# Patient Record
Sex: MALE | Race: WHITE | NOT HISPANIC OR LATINO | Employment: FULL TIME | URBAN - METROPOLITAN AREA
[De-identification: names, ages, dates, MRNs, and addresses within clinical notes are randomized per-mention and may not be internally consistent; named-entity substitution may affect disease eponyms.]

---

## 2018-09-18 NOTE — EDM.PDOC
ED HPI GENERAL MEDICAL PROBLEM





- General


Chief Complaint: Lower Extremity Injury/Pain


Stated Complaint: [facility]


Time Seen by Provider: 09/18/18 23:32


Source of Information: Reports: Patient


History Limitations: Reports: No Limitations





- History of Present Illness


INITIAL COMMENTS - FREE TEXT/NARRATIVE: 





The patient presents by [facility] for bleeding from his right lower 

leg.  The patient he helped a friend move over a week ago.  He fell and had an 

abrasion to the right leg.  Today he was at work at [employer] and the scab broke 

loose in the right leg and it started bleeding.  He has lymphedema in both 

legs.  He does have what appears to be a venous stasus ulcer or an abrasion 

that was not healing.


Onset: Sudden


Duration: Minutes:


Location: Reports: Lower Extremity, Right (lower leg)


Improves with: Reports: None


Worsens with: Reports: None


Associated Symptoms: Reports: No Other Symptoms





- Related Data


 Allergies











Allergy/AdvReac Type Severity Reaction Status Date / Time


 


No Known Allergies Allergy   Verified 09/18/18 23:28











Home Meds: 


 Home Meds





Cephalexin [Keflex] 500 mg PO QID #40 capsule 09/18/18 [Rx]











Past Medical History


Endocrine/Metabolic History: Reports: Other (See Below)


Other Endocrine/Metabolic History: Lymphadema





- Past Surgical History


HEENT Surgical History: Reports: Tonsillectomy





Social & Family History





- Tobacco Use


Smoking Status *Q: Never Smoker





- Recreational Drug Use


Recreational Drug Use: No





Review of Systems





- Review of Systems


Review Of Systems: See Below


Constitutional: Reports: No Symptoms


Eyes: Reports: No Symptoms


Ears: Reports: No Symptoms


Nose: Reports: No Symptoms


Mouth/Throat: Reports: No Symptoms


Respiratory: Reports: No Symptoms


Cardiovascular: Reports: No Symptoms


GI/Abdominal: Reports: No Symptoms


Genitourinary: Reports: No Symptoms


Musculoskeletal: Reports: Other (Bleeding from the right lower leg)





ED EXAM, GENERAL





- Physical Exam


Exam: See Below


Exam Limited By: No Limitations


General Appearance: Alert, No Apparent Distress


Ears: Normal External Exam


Nose: Normal Inspection


Head: Atraumatic, Normocephalic


Neck: Normal Inspection


Respiratory/Chest: No Respiratory Distress


Extremities: Other (Venous stasis ulcer or a nonhealed ulcer to the right lower 

leg with no active bleeding at this time but there is erythema around the wound.

)





Course





- Vital Signs


Last Recorded V/S: 





 Last Vital Signs











Temp  98.4 F   09/18/18 23:28


 


Pulse  97   09/18/18 23:28


 


Resp  20   09/18/18 23:28


 


BP  144/80 H  09/18/18 23:28


 


Pulse Ox  95   09/18/18 23:28














- Re-Assessments/Exams


Free Text/Narrative Re-Assessment/Exam: 





09/18/18 23:54


I will have my nurse clean and dress the wound.  There is erythema and this 

more then likely is infected.  I will need to get him on some keflex and have 

him follow up with one of our providers.





Departure





- Departure


Time of Disposition: 23:55


Disposition: Home, Self-Care 01


Condition: Good


Clinical Impression: 


 Cellulitis of right leg





Ulcer of right leg


Qualifiers:


 Non-pressure ulcer stage: unspecified non-pressure ulcer stage Qualified Code(s

): L97.919 - Non-pressure chronic ulcer of unspecified part of right lower leg 

with unspecified severity








- Discharge Information


*PRESCRIPTION DRUG MONITORING PROGRAM REVIEWED*: No


*COPY OF PRESCRIPTION DRUG MONITORING REPORT IN PATIENT BOONE: No


Prescriptions: 


Cephalexin [Keflex] 500 mg PO QID #40 capsule


Referrals: 


Kaya Link PA-C [Physician Assistant] - 1 Week


Additional Instructions: 


Take the keflex 4 times per day for 10 days.  Wash your leg in warm soapy water 

2 times per day and apply antibiotic ointment after.  Please return if you are 

back.

## 2019-12-02 NOTE — EDM.PDOC
ED HPI GENERAL MEDICAL PROBLEM





- General


Chief Complaint: Lower Extremity Injury/Pain


Stated Complaint: RT LEG IS SWOLLEN LEAKING FLUID SENT FROM [facility]


Time Seen by Provider: 12/02/19 17:06


Source of Information: Reports: Patient


History Limitations: Reports: No Limitations





- History of Present Illness


INITIAL COMMENTS - FREE TEXT/NARRATIVE: 





The patient was sent from the walk in clinic.  He has a history of lymphedema 

and his right leg started swelling more on Thanksgiving.  He then developed 

some redness and skin break down.  He has no fever or chills or other symptoms.

  This has been worse then this and he is hoping to try some oral antibiotics 

first.


Onset: Gradual


Duration: Day(s):


Location: Reports: Lower Extremity, Right (lower leg)


Improves with: Reports: None


Worsens with: Reports: None


Associated Symptoms: Reports: No Other Symptoms


  ** Right Lower Leg


Pain Score (Numeric/FACES): 6





- Related Data


 Allergies











Allergy/AdvReac Type Severity Reaction Status Date / Time


 


No Known Allergies Allergy   Verified 12/02/19 17:08











Home Meds: 


 Home Meds





Cephalexin [Keflex] 500 mg PO QID #40 capsule 09/18/18 [Rx]


Sulfamethoxazole/Trimethoprim [Bactrim Ds Tablet] 2 each PO BID #40 tablet 12/02 /19 [Rx]











Past Medical History


Endocrine/Metabolic History: Reports: Other (See Below)


Other Endocrine/Metabolic History: Lymphadema





- Past Surgical History


HEENT Surgical History: Reports: Tonsillectomy





Social & Family History





- Tobacco Use


Smoking Status *Q: Never Smoker





- Caffeine Use


Caffeine Use: Reports: Coffee





Review of Systems





- Review of Systems


Review Of Systems: See Below


Constitutional: Reports: No Symptoms


Eyes: Reports: No Symptoms


Ears: Reports: No Symptoms


Nose: Reports: No Symptoms


Mouth/Throat: Reports: No Symptoms


Respiratory: Reports: No Symptoms


Cardiovascular: Reports: No Symptoms


GI/Abdominal: Reports: No Symptoms


Genitourinary: Reports: No Symptoms


Musculoskeletal: Reports: Other (right leg swelling and erythema)





ED EXAM, GENERAL





- Physical Exam


Exam: See Below


Exam Limited By: No Limitations


General Appearance: Alert, No Apparent Distress


Ears: Normal External Exam


Nose: Normal Inspection


Head: Atraumatic, Normocephalic


Neck: Normal Inspection


Respiratory/Chest: No Respiratory Distress, Lungs Clear, Normal Breath Sounds


Cardiovascular: Regular Rate, Rhythm, No Edema, No Murmur


GI/Abdominal: Soft, Non-Tender, No Organomegaly, No Mass


Back Exam: Normal Inspection


Extremities: Other (Moderate to sever edema to the right lower leg with 

erythema and some skin break down and drainage.)





Course





- Vital Signs


Last Recorded V/S: 





 Last Vital Signs











Temp  97.5 F   12/02/19 17:05


 


Pulse  83   12/02/19 17:05


 


Resp  16   12/02/19 17:05


 


BP  150/89 H  12/02/19 17:05


 


Pulse Ox  99   12/02/19 17:05














- Re-Assessments/Exams


Free Text/Narrative Re-Assessment/Exam: 





12/02/19 17:23


He would like to try this as an outpatient with oral antibiotics.  I will get 

him on some bactrim.





Departure





- Departure


Time of Disposition: 17:30


Disposition: Home, Self-Care 01


Condition: Good


Clinical Impression: 


 Cellulitis of right leg








- Discharge Information


*PRESCRIPTION DRUG MONITORING PROGRAM REVIEWED*: No


*COPY OF PRESCRIPTION DRUG MONITORING REPORT IN PATIENT BOONE: No


Prescriptions: 


Sulfamethoxazole/Trimethoprim [Bactrim Ds Tablet] 2 each PO BID #40 tablet


Referrals: 


PCP,Not In Area [Primary Care Provider] - 


Additional Instructions: 


Clean your leg with warm soapy water 2 times per day and apply antibiotic 

ointment after.  Take the bactrim DS 2 pills 2 times per day for 10 days.  Put 

warm compresses on your leg 2 to 3 times per day and please return if you are 

worse.

## 2020-03-28 NOTE — EDM.PDOC
ED HPI GENERAL MEDICAL PROBLEM





- General


Chief Complaint: Skin Complaint


Stated Complaint: SKIN COMPLAINT/R LEG


Time Seen by Provider: 03/28/20 15:42


Source of Information: Reports: Patient


History Limitations: Reports: No Limitations





- History of Present Illness


INITIAL COMMENTS - FREE TEXT/NARRATIVE: 





Patient is a 39-year-old male who presents with complaints of redness or warmth

, and erythema to his right lower extremity.  Patient has a diagnosis of 

lymphedema for which he has wraps applied weekly with physical therapy.  He 

states yesterday he noticed some redness and warmth around the scab to the 

medial aspect of his right knee.  He has had occurrences such as this in the 

past and been treated with oral antibiotics for cellulitis in the infection 

clears up well.  Has not had a fever, chills, nausea, vomiting, or diarrhea.  

He sees physical therapy on Monday for wound care.


  ** Right Knee


Pain Score (Numeric/FACES): 3





- Related Data


 Allergies











Allergy/AdvReac Type Severity Reaction Status Date / Time


 


No Known Allergies Allergy   Verified 12/02/19 17:08











Home Meds: 


 Home Meds





Empagliflozin [Jardiance] 25 mg PO DAILY 03/28/20 [History]


Furosemide 20 mg PO DAILY 03/28/20 [History]


Sulfamethoxazole/Trimethoprim [Bactrim Ds Tablet] 2 each PO BID 10 Days #40 

tablet 03/28/20 [Rx]











Past Medical History


Endocrine/Metabolic History: Reports: Other (See Below)


Other Endocrine/Metabolic History: Lymphadema





- Past Surgical History


HEENT Surgical History: Reports: Tonsillectomy





Social & Family History





- Tobacco Use


Smoking Status *Q: Never Smoker





- Caffeine Use


Caffeine Use: Reports: Tea





- Recreational Drug Use


Recreational Drug Use: No





ED ROS GENERAL





- Review of Systems


Review Of Systems: Comprehensive ROS is negative, except as noted in HPI.





ED EXAM, SKIN/RASH


Exam: See Below


Exam Limited By: No Limitations


General Appearance: Alert, WD/WN, No Apparent Distress


Respiratory/Chest: No Respiratory Distress, Lungs Clear, Normal Breath Sounds, 

No Accessory Muscle Use, Chest Non-Tender


Cardiovascular: Normal Peripheral Pulses, Regular Rate, Rhythm, No Edema, No 

Gallop, No JVD, No Murmur, No Rub


Extremities: Other (Lymphedema to bilateral lower extremities.  Right more 

edematous than the left.  Small scab with mild redness, warmth, and erythema 

surrounding to the medial aspect of the right knee.  )





Course





- Vital Signs


Last Recorded V/S: 





 Last Vital Signs











Temp  97.2 F   03/28/20 14:56


 


Pulse  69   03/28/20 14:56


 


Resp  16   03/28/20 14:56


 


BP  135/72   03/28/20 14:56


 


Pulse Ox  96   03/28/20 14:56














- Re-Assessments/Exams


Free Text/Narrative Re-Assessment/Exam: 





03/28/20 16:04


Wraps to the bilateral lower extremities were removed.  Lower extremities were 

visualized and there are no ulcers, redness, or swelling, with the exception of 

the one area of localized erythema around a scab to the medial aspect of the 

right knee.  Patient has been seen in this ER on a couple other occasions for 

similar complaints.  He has been treated with Bactrim and Keflex both of which 

she states did resolve the cellulitis without issues.  I rewrapped his lower 

extremities with a double layer of Ace wraps.  He does have a follow-up with PT 

on Monday and he states they can reapply the wraps at that time.  We will 

discharge him home with a prescription for Bactrim DS 2 tabs twice daily for 10 

days.  Discharge instructions as documented.





Departure





- Departure


Time of Disposition: 16:05


Disposition: Home, Self-Care 01


Condition: Fair


Clinical Impression: 


Cellulitis


Qualifiers:


 Site of cellulitis: extremity Site of cellulitis of extremity: lower extremity 

Laterality: right Qualified Code(s): L03.115 - Cellulitis of right lower limb








- Discharge Information


*PRESCRIPTION DRUG MONITORING PROGRAM REVIEWED*: No


*COPY OF PRESCRIPTION DRUG MONITORING REPORT IN PATIENT BOONE: No


Prescriptions: 


Sulfamethoxazole/Trimethoprim [Bactrim Ds Tablet] 2 each PO BID 10 Days #40 

tablet


Instructions:  Cellulitis, Adult


Referrals: 


Kaya Link PA-C [Primary Care Provider] - 


Additional Instructions: 


You were seen in the emergency department today for redness and warmth to the 

medial aspect of your right knee.  The wraps of your lower extremities were 

removed and no other areas of redness or erythema were found.  Your legs were 

rewrapped with Ace wraps.  Recommend that you follow-up with your PT 

appointment on Monday to have your wraps reapplied.  A prescription for Bactrim 

has been sent to [facility].  Take this medication as prescribed.  

Continue to monitor the area for increased redness, warmth, or purulent 

drainage.  In addition if you should develop fever, chills, nausea, or vomiting

, we would recommend that you return to the emergency department.  Follow-up 

with primary care provider as needed.





Sepsis Event Note





- Evaluation


Sepsis Screening Result: No Definite Risk





- Focused Exam


Vital Signs: 





 Vital Signs











  Temp Pulse Resp BP Pulse Ox


 


 03/28/20 14:56  97.2 F  69  16  135/72  96











Date Exam was Performed: 03/28/20


Time Exam was Performed: 16:01

## 2024-11-18 ENCOUNTER — APPOINTMENT (OUTPATIENT)
Dept: RADIOLOGY | Facility: MEDICAL CENTER | Age: 44
DRG: 638 | End: 2024-11-18
Attending: EMERGENCY MEDICINE
Payer: COMMERCIAL

## 2024-11-18 ENCOUNTER — HOSPITAL ENCOUNTER (INPATIENT)
Facility: MEDICAL CENTER | Age: 44
LOS: 1 days | DRG: 638 | End: 2024-11-18
Attending: EMERGENCY MEDICINE | Admitting: STUDENT IN AN ORGANIZED HEALTH CARE EDUCATION/TRAINING PROGRAM
Payer: COMMERCIAL

## 2024-11-18 VITALS
RESPIRATION RATE: 18 BRPM | WEIGHT: 302.25 LBS | HEART RATE: 94 BPM | OXYGEN SATURATION: 94 % | TEMPERATURE: 97.4 F | DIASTOLIC BLOOD PRESSURE: 83 MMHG | SYSTOLIC BLOOD PRESSURE: 149 MMHG | BODY MASS INDEX: 48.58 KG/M2 | HEIGHT: 66 IN

## 2024-11-18 DIAGNOSIS — L97.501: ICD-10-CM

## 2024-11-18 DIAGNOSIS — E10.621: ICD-10-CM

## 2024-11-18 DIAGNOSIS — L03.116 CELLULITIS OF FOOT, LEFT: ICD-10-CM

## 2024-11-18 DIAGNOSIS — R73.9 HYPERGLYCEMIA: ICD-10-CM

## 2024-11-18 PROBLEM — M86.9 LEFT HALLUX OSTEOMYELITIS (HCC): Status: ACTIVE | Noted: 2024-11-18

## 2024-11-18 PROBLEM — G47.33 OSA (OBSTRUCTIVE SLEEP APNEA): Status: ACTIVE | Noted: 2024-11-18

## 2024-11-18 PROBLEM — E66.01 SEVERE OBESITY (BMI >= 40) (HCC): Status: ACTIVE | Noted: 2024-11-18

## 2024-11-18 PROBLEM — Z01.818 ENCOUNTER FOR PREOPERATIVE ASSESSMENT: Status: RESOLVED | Noted: 2024-11-18 | Resolved: 2024-11-18

## 2024-11-18 PROBLEM — L97.509 TYPE 2 DIABETES MELLITUS WITH FOOT ULCER, WITHOUT LONG-TERM CURRENT USE OF INSULIN (HCC): Status: ACTIVE | Noted: 2024-11-18

## 2024-11-18 PROBLEM — E11.621 TYPE 2 DIABETES MELLITUS WITH FOOT ULCER, WITHOUT LONG-TERM CURRENT USE OF INSULIN (HCC): Status: ACTIVE | Noted: 2024-11-18

## 2024-11-18 PROBLEM — Z01.818 ENCOUNTER FOR PREOPERATIVE ASSESSMENT: Status: ACTIVE | Noted: 2024-11-18

## 2024-11-18 PROBLEM — N17.9 AKI (ACUTE KIDNEY INJURY) (HCC): Status: ACTIVE | Noted: 2024-11-18

## 2024-11-18 LAB
ALBUMIN SERPL BCP-MCNC: 2.7 G/DL (ref 3.2–4.9)
ALBUMIN/GLOB SERPL: 0.7 G/DL
ALP SERPL-CCNC: 128 U/L (ref 30–99)
ALT SERPL-CCNC: 16 U/L (ref 2–50)
ANION GAP SERPL CALC-SCNC: 9 MMOL/L (ref 7–16)
AST SERPL-CCNC: 15 U/L (ref 12–45)
B-OH-BUTYR SERPL-MCNC: 0.17 MMOL/L (ref 0.02–0.27)
BASOPHILS # BLD AUTO: 0.3 % (ref 0–1.8)
BASOPHILS # BLD: 0.03 K/UL (ref 0–0.12)
BILIRUB SERPL-MCNC: 0.4 MG/DL (ref 0.1–1.5)
BUN SERPL-MCNC: 16 MG/DL (ref 8–22)
CALCIUM ALBUM COR SERPL-MCNC: 9.7 MG/DL (ref 8.5–10.5)
CALCIUM SERPL-MCNC: 8.7 MG/DL (ref 8.5–10.5)
CHLORIDE SERPL-SCNC: 101 MMOL/L (ref 96–112)
CO2 SERPL-SCNC: 23 MMOL/L (ref 20–33)
CREAT SERPL-MCNC: 1.3 MG/DL (ref 0.5–1.4)
CRP SERPL HS-MCNC: 19.7 MG/DL (ref 0–0.75)
EOSINOPHIL # BLD AUTO: 0.13 K/UL (ref 0–0.51)
EOSINOPHIL NFR BLD: 1.4 % (ref 0–6.9)
ERYTHROCYTE [DISTWIDTH] IN BLOOD BY AUTOMATED COUNT: 40 FL (ref 35.9–50)
ERYTHROCYTE [SEDIMENTATION RATE] IN BLOOD BY WESTERGREN METHOD: 31 MM/HOUR (ref 0–20)
EST. AVERAGE GLUCOSE BLD GHB EST-MCNC: 292 MG/DL
GFR SERPLBLD CREATININE-BSD FMLA CKD-EPI: 70 ML/MIN/1.73 M 2
GLOBULIN SER CALC-MCNC: 3.9 G/DL (ref 1.9–3.5)
GLUCOSE BLD STRIP.AUTO-MCNC: 193 MG/DL (ref 65–99)
GLUCOSE BLD STRIP.AUTO-MCNC: 268 MG/DL (ref 65–99)
GLUCOSE BLD STRIP.AUTO-MCNC: 352 MG/DL (ref 65–99)
GLUCOSE BLD STRIP.AUTO-MCNC: 421 MG/DL (ref 65–99)
GLUCOSE SERPL-MCNC: 454 MG/DL (ref 65–99)
HBA1C MFR BLD: 11.8 % (ref 4–5.6)
HCT VFR BLD AUTO: 40.5 % (ref 42–52)
HGB BLD-MCNC: 13.1 G/DL (ref 14–18)
IMM GRANULOCYTES # BLD AUTO: 0.19 K/UL (ref 0–0.11)
IMM GRANULOCYTES NFR BLD AUTO: 2 % (ref 0–0.9)
LACTATE SERPL-SCNC: 1 MMOL/L (ref 0.5–2)
LYMPHOCYTES # BLD AUTO: 1.09 K/UL (ref 1–4.8)
LYMPHOCYTES NFR BLD: 11.4 % (ref 22–41)
MCH RBC QN AUTO: 27.9 PG (ref 27–33)
MCHC RBC AUTO-ENTMCNC: 32.3 G/DL (ref 32.3–36.5)
MCV RBC AUTO: 86.2 FL (ref 81.4–97.8)
MONOCYTES # BLD AUTO: 0.76 K/UL (ref 0–0.85)
MONOCYTES NFR BLD AUTO: 8 % (ref 0–13.4)
NEUTROPHILS # BLD AUTO: 7.34 K/UL (ref 1.82–7.42)
NEUTROPHILS NFR BLD: 76.9 % (ref 44–72)
NRBC # BLD AUTO: 0 K/UL
NRBC BLD-RTO: 0 /100 WBC (ref 0–0.2)
PLATELET # BLD AUTO: 259 K/UL (ref 164–446)
PMV BLD AUTO: 10.4 FL (ref 9–12.9)
POTASSIUM SERPL-SCNC: 3.9 MMOL/L (ref 3.6–5.5)
PROT SERPL-MCNC: 6.6 G/DL (ref 6–8.2)
RBC # BLD AUTO: 4.7 M/UL (ref 4.7–6.1)
SODIUM SERPL-SCNC: 133 MMOL/L (ref 135–145)
WBC # BLD AUTO: 9.5 K/UL (ref 4.8–10.8)

## 2024-11-18 PROCEDURE — 700102 HCHG RX REV CODE 250 W/ 637 OVERRIDE(OP): Performed by: STUDENT IN AN ORGANIZED HEALTH CARE EDUCATION/TRAINING PROGRAM

## 2024-11-18 PROCEDURE — 73630 X-RAY EXAM OF FOOT: CPT | Mod: LT

## 2024-11-18 PROCEDURE — 80053 COMPREHEN METABOLIC PANEL: CPT

## 2024-11-18 PROCEDURE — 700102 HCHG RX REV CODE 250 W/ 637 OVERRIDE(OP): Performed by: EMERGENCY MEDICINE

## 2024-11-18 PROCEDURE — 83605 ASSAY OF LACTIC ACID: CPT

## 2024-11-18 PROCEDURE — 87040 BLOOD CULTURE FOR BACTERIA: CPT

## 2024-11-18 PROCEDURE — 36415 COLL VENOUS BLD VENIPUNCTURE: CPT

## 2024-11-18 PROCEDURE — 71045 X-RAY EXAM CHEST 1 VIEW: CPT

## 2024-11-18 PROCEDURE — 700111 HCHG RX REV CODE 636 W/ 250 OVERRIDE (IP): Performed by: EMERGENCY MEDICINE

## 2024-11-18 PROCEDURE — 85025 COMPLETE CBC W/AUTO DIFF WBC: CPT

## 2024-11-18 PROCEDURE — 770006 HCHG ROOM/CARE - MED/SURG/GYN SEMI*

## 2024-11-18 PROCEDURE — 82010 KETONE BODYS QUAN: CPT

## 2024-11-18 PROCEDURE — 86140 C-REACTIVE PROTEIN: CPT

## 2024-11-18 PROCEDURE — 85652 RBC SED RATE AUTOMATED: CPT

## 2024-11-18 PROCEDURE — 99235 HOSP IP/OBS SAME DATE MOD 70: CPT | Performed by: STUDENT IN AN ORGANIZED HEALTH CARE EDUCATION/TRAINING PROGRAM

## 2024-11-18 PROCEDURE — 700105 HCHG RX REV CODE 258: Performed by: EMERGENCY MEDICINE

## 2024-11-18 PROCEDURE — 96372 THER/PROPH/DIAG INJ SC/IM: CPT

## 2024-11-18 PROCEDURE — 99285 EMERGENCY DEPT VISIT HI MDM: CPT

## 2024-11-18 PROCEDURE — 83036 HEMOGLOBIN GLYCOSYLATED A1C: CPT

## 2024-11-18 PROCEDURE — 82962 GLUCOSE BLOOD TEST: CPT

## 2024-11-18 RX ORDER — ACETAMINOPHEN 500 MG
1000 TABLET ORAL EVERY 6 HOURS PRN
Status: DISCONTINUED | OUTPATIENT
Start: 2024-11-18 | End: 2024-11-18 | Stop reason: HOSPADM

## 2024-11-18 RX ORDER — SENNOSIDES 8.6 MG
1950 CAPSULE ORAL
COMMUNITY

## 2024-11-18 RX ORDER — ONDANSETRON 2 MG/ML
4 INJECTION INTRAMUSCULAR; INTRAVENOUS EVERY 4 HOURS PRN
Status: DISCONTINUED | OUTPATIENT
Start: 2024-11-18 | End: 2024-11-18 | Stop reason: HOSPADM

## 2024-11-18 RX ORDER — DEXTROSE MONOHYDRATE 25 G/50ML
25 INJECTION, SOLUTION INTRAVENOUS
Status: DISCONTINUED | OUTPATIENT
Start: 2024-11-18 | End: 2024-11-18 | Stop reason: HOSPADM

## 2024-11-18 RX ORDER — CEPHALEXIN 500 MG/1
500 CAPSULE ORAL 4 TIMES DAILY
Status: SHIPPED | COMMUNITY
Start: 2024-11-07 | End: 2024-11-18

## 2024-11-18 RX ORDER — INSULIN LISPRO 100 [IU]/ML
5 INJECTION, SOLUTION INTRAVENOUS; SUBCUTANEOUS ONCE
Status: COMPLETED | OUTPATIENT
Start: 2024-11-18 | End: 2024-11-18

## 2024-11-18 RX ORDER — AMOXICILLIN 250 MG
2 CAPSULE ORAL EVERY EVENING
Status: DISCONTINUED | OUTPATIENT
Start: 2024-11-18 | End: 2024-11-18 | Stop reason: HOSPADM

## 2024-11-18 RX ORDER — PROCHLORPERAZINE EDISYLATE 5 MG/ML
10 INJECTION INTRAMUSCULAR; INTRAVENOUS EVERY 6 HOURS PRN
Status: DISCONTINUED | OUTPATIENT
Start: 2024-11-18 | End: 2024-11-18 | Stop reason: HOSPADM

## 2024-11-18 RX ORDER — SODIUM CHLORIDE 9 MG/ML
1000 INJECTION, SOLUTION INTRAVENOUS ONCE
Status: COMPLETED | OUTPATIENT
Start: 2024-11-18 | End: 2024-11-18

## 2024-11-18 RX ORDER — LOSARTAN POTASSIUM 25 MG/1
25 TABLET ORAL DAILY
COMMUNITY

## 2024-11-18 RX ORDER — GUAIFENESIN/DEXTROMETHORPHAN 100-10MG/5
10 SYRUP ORAL EVERY 6 HOURS PRN
Status: DISCONTINUED | OUTPATIENT
Start: 2024-11-18 | End: 2024-11-18 | Stop reason: HOSPADM

## 2024-11-18 RX ORDER — ONDANSETRON 4 MG/1
4 TABLET, ORALLY DISINTEGRATING ORAL EVERY 4 HOURS PRN
Status: DISCONTINUED | OUTPATIENT
Start: 2024-11-18 | End: 2024-11-18 | Stop reason: HOSPADM

## 2024-11-18 RX ORDER — DOXYCYCLINE 100 MG/1
100 CAPSULE ORAL 2 TIMES DAILY
Status: SHIPPED | COMMUNITY
Start: 2024-11-07 | End: 2024-11-18

## 2024-11-18 RX ORDER — POLYETHYLENE GLYCOL 3350 17 G/17G
1 POWDER, FOR SOLUTION ORAL
Status: DISCONTINUED | OUTPATIENT
Start: 2024-11-18 | End: 2024-11-18 | Stop reason: HOSPADM

## 2024-11-18 RX ORDER — INSULIN LISPRO 100 [IU]/ML
2-9 INJECTION, SOLUTION INTRAVENOUS; SUBCUTANEOUS EVERY 6 HOURS
Status: DISCONTINUED | OUTPATIENT
Start: 2024-11-18 | End: 2024-11-18 | Stop reason: HOSPADM

## 2024-11-18 RX ORDER — FUROSEMIDE 20 MG/1
20 TABLET ORAL 2 TIMES DAILY
COMMUNITY

## 2024-11-18 RX ADMIN — INSULIN LISPRO 2 UNITS: 100 INJECTION, SOLUTION INTRAVENOUS; SUBCUTANEOUS at 17:52

## 2024-11-18 RX ADMIN — INSULIN GLARGINE-YFGN 25 UNITS: 100 INJECTION, SOLUTION SUBCUTANEOUS at 17:52

## 2024-11-18 RX ADMIN — INSULIN LISPRO 5 UNITS: 100 INJECTION, SOLUTION INTRAVENOUS; SUBCUTANEOUS at 11:37

## 2024-11-18 RX ADMIN — INSULIN LISPRO 5 UNITS: 100 INJECTION, SOLUTION INTRAVENOUS; SUBCUTANEOUS at 13:29

## 2024-11-18 RX ADMIN — SODIUM CHLORIDE 1000 ML: 9 INJECTION, SOLUTION INTRAVENOUS at 11:00

## 2024-11-18 SDOH — ECONOMIC STABILITY: TRANSPORTATION INSECURITY
IN THE PAST 12 MONTHS, HAS THE LACK OF TRANSPORTATION KEPT YOU FROM MEDICAL APPOINTMENTS OR FROM GETTING MEDICATIONS?: NO

## 2024-11-18 SDOH — ECONOMIC STABILITY: TRANSPORTATION INSECURITY
IN THE PAST 12 MONTHS, HAS LACK OF RELIABLE TRANSPORTATION KEPT YOU FROM MEDICAL APPOINTMENTS, MEETINGS, WORK OR FROM GETTING THINGS NEEDED FOR DAILY LIVING?: NO

## 2024-11-18 ASSESSMENT — ENCOUNTER SYMPTOMS
WEAKNESS: 1
COUGH: 0
HEADACHES: 0
NERVOUS/ANXIOUS: 0
FALLS: 0
FEVER: 1
EYE PAIN: 0
SORE THROAT: 0
DIZZINESS: 0
BACK PAIN: 0
DIAPHORESIS: 0
BLOOD IN STOOL: 0
CHILLS: 0
ABDOMINAL PAIN: 0
MYALGIAS: 1
VOMITING: 0
DIARRHEA: 0
SHORTNESS OF BREATH: 0
CONSTIPATION: 0
NECK PAIN: 0
NAUSEA: 0
LOSS OF CONSCIOUSNESS: 0
SINUS PAIN: 0
DEPRESSION: 0

## 2024-11-18 ASSESSMENT — COGNITIVE AND FUNCTIONAL STATUS - GENERAL
MOBILITY SCORE: 23
SUGGESTED CMS G CODE MODIFIER MOBILITY: CI
DRESSING REGULAR LOWER BODY CLOTHING: A LITTLE
DAILY ACTIVITIY SCORE: 23
SUGGESTED CMS G CODE MODIFIER DAILY ACTIVITY: CI
CLIMB 3 TO 5 STEPS WITH RAILING: A LITTLE

## 2024-11-18 ASSESSMENT — PATIENT HEALTH QUESTIONNAIRE - PHQ9
2. FEELING DOWN, DEPRESSED, IRRITABLE, OR HOPELESS: NOT AT ALL
SUM OF ALL RESPONSES TO PHQ9 QUESTIONS 1 AND 2: 0
1. LITTLE INTEREST OR PLEASURE IN DOING THINGS: NOT AT ALL

## 2024-11-18 ASSESSMENT — SOCIAL DETERMINANTS OF HEALTH (SDOH)
WITHIN THE LAST YEAR, HAVE YOU BEEN AFRAID OF YOUR PARTNER OR EX-PARTNER?: NO
WITHIN THE LAST YEAR, HAVE YOU BEEN HUMILIATED OR EMOTIONALLY ABUSED IN OTHER WAYS BY YOUR PARTNER OR EX-PARTNER?: NO
WITHIN THE LAST YEAR, HAVE TO BEEN RAPED OR FORCED TO HAVE ANY KIND OF SEXUAL ACTIVITY BY YOUR PARTNER OR EX-PARTNER?: NO
WITHIN THE LAST YEAR, HAVE YOU BEEN KICKED, HIT, SLAPPED, OR OTHERWISE PHYSICALLY HURT BY YOUR PARTNER OR EX-PARTNER?: NO
WITHIN THE PAST 12 MONTHS, YOU WORRIED THAT YOUR FOOD WOULD RUN OUT BEFORE YOU GOT THE MONEY TO BUY MORE: SOMETIMES TRUE
IN THE PAST 12 MONTHS, HAS THE ELECTRIC, GAS, OIL, OR WATER COMPANY THREATENED TO SHUT OFF SERVICE IN YOUR HOME?: NO
WITHIN THE PAST 12 MONTHS, THE FOOD YOU BOUGHT JUST DIDN'T LAST AND YOU DIDN'T HAVE MONEY TO GET MORE: NEVER TRUE

## 2024-11-18 ASSESSMENT — LIFESTYLE VARIABLES
EVER HAD A DRINK FIRST THING IN THE MORNING TO STEADY YOUR NERVES TO GET RID OF A HANGOVER: NO
TOTAL SCORE: 0
TOTAL SCORE: 0
ALCOHOL_USE: NO
EVER FELT BAD OR GUILTY ABOUT YOUR DRINKING: NO
DOES PATIENT WANT TO STOP DRINKING: CANNOT ASSESS
HOW MANY TIMES IN THE PAST YEAR HAVE YOU HAD 5 OR MORE DRINKS IN A DAY: 0
AVERAGE NUMBER OF DAYS PER WEEK YOU HAVE A DRINK CONTAINING ALCOHOL: 0
CONSUMPTION TOTAL: NEGATIVE
HAVE YOU EVER FELT YOU SHOULD CUT DOWN ON YOUR DRINKING: NO
TOTAL SCORE: 0
ON A TYPICAL DAY WHEN YOU DRINK ALCOHOL HOW MANY DRINKS DO YOU HAVE: 0
HAVE PEOPLE ANNOYED YOU BY CRITICIZING YOUR DRINKING: NO

## 2024-11-18 ASSESSMENT — FIBROSIS 4 INDEX: FIB4 SCORE: 0.62

## 2024-11-18 ASSESSMENT — PAIN DESCRIPTION - PAIN TYPE: TYPE: ACUTE PAIN

## 2024-11-18 NOTE — PROGRESS NOTES
4 Eyes Skin Assessment Completed by ANDRE Harley and ANDRE Alvarado.    Head WDL  Ears WDL  Nose WDL  Mouth WDL  Neck WDL  Breast/Chest WDL  Shoulder Blades WDL  Spine WDL  (R) Arm/Elbow/Hand WDL  (L) Arm/Elbow/Hand WDL  Abdomen WDL  Groin Redness, discoloration  Scrotum/Coccyx/Buttocks Redness and Blanching  (R) Leg Redness, Swelling, and Edema  (L) Leg Redness, Bruising, Swelling, Abrasion, Weeping, and Edema, diabetic foot ulcer  (R) Heel/Foot/Toe Redness, Discoloration, Swelling, and Edema  (L) Heel/Foot/Toe Redness, Discoloration, Swelling, and Edema          Devices In Places N/A      Interventions In Place Heel Mepilex, Barrier Cream, and Heels Loaded W/Pillows    Possible Skin Injury Yes    Pictures Uploaded Into Epic Yes  Wound Consult Placed Yes  RN Wound Prevention Protocol Ordered Yes

## 2024-11-18 NOTE — ASSESSMENT & PLAN NOTE
Likely due to excess fluid loss from wounds  1 L NS, encourage PO when not NPO for surgery  Repeat AM CMP

## 2024-11-18 NOTE — ASSESSMENT & PLAN NOTE
Admit to:    Medicine    Cardiovascular:   Patient does not have history of CHF  Pre-op EKG: No     Pulmonary:  Oxygen per protocol  Incentive Spirometer    GI:   No history of cirrhosis. Standard bowel regimen. Hold for loose stools.    Renal:   IV fluids: No fluids - risk of fluid overload    Labs: Metabolic Panel with AM labs    Musculoskeletal:   Check 25 OH vitamin D level. If 31-40 pg/mL, consider starting vitamin D3 1000 IU PO daily. If 20-30 pg/mL, consider vitamin D3 2000 IU PO daily. If <20 pg/mL, vitamin D2 50,000 IU weekly x 8 weeks then 2000 IU PO daily.    Neurologic:   Non-opoid pain control preoperative, opiate pain medication postoperative    Hematologic:  Plan on pharmacologic DVT prophylaxis post operative day #1. Hold for decreasing hemoglobin. Notify provider for hemoglobin less than 8.  Order preoperative type and cross.   Hgb every 6 hours if high bleeding risk.   If patient was on anticoagulation prior to arrival risks and benefits will be weighed by teams including surgery, hospitalist, geriatrics, and anesthesia for delaying surgery more than 24 hours.   On anticoagulation prior to arrival: No    Medical Assessment Risk:  Intermediate    Surgical Risk:   Low

## 2024-11-18 NOTE — PROGRESS NOTES
Patient arrived to unit by radu, currently in [place], waiting for bed to be clean. Patient is Aox4 and reports no pain.

## 2024-11-18 NOTE — ASSESSMENT & PLAN NOTE
Not on CPAP due to transition of insurance and providers after sleep study  Referral to pulmonary sleep medicine if needed at discharge

## 2024-11-18 NOTE — ED NOTES
Medication history reviewed with patient at bedside and patients home pharmacy ([facility] [phone]).     Med rec is complete  Allergies reviewed.     Patient was prescribed a 5 day course of Keflex 500mg qid  & a 10 day  course of Doxycycline 100mg bid both on 11/7/24 from 2 different MD's- Patient states he finished the Keflex but still has a few doses of Doxycycline left. Patient states he stopped Doxycycline Friday 11/15/24.     Anticoagulants: No    Dinesh Malhotra PhT

## 2024-11-18 NOTE — H&P
Hospital Medicine History & Physical Note    Date of Service  11/18/2024    Primary Care Physician  Pcp Not In Computer    Consultants  orthopedics    Specialist Names: Dr. Small    Code Status  Full Code    Chief Complaint  Chief Complaint   Patient presents with    Wound Check       History of Presenting Illness  Marek Francis Jr. is a 43 y.o. male with T2DM, HTN, and RAFAELA who presented 11/18/2024 with left foot wound.    He reports LLE stiffness with a sometimes sharp/jabbing pain radiating down the leg. It is as bad as 8/10, worsens with walking for which he uses a cane, and slightly improves with excedrin and acetaminophen. He has seen [facility] twice for which he was prescribed 5 days of cephalexin and then 10 days of doxycycline without improvement. The wound has been drenching his feet. He developed a fever of 101 F on Wednesday. He reports being referred to an endocrinologist for T2DM and was on an oral GLP1 but could not afford it and has been off medication since July 2024. He believes his A1c was around 11. He denies bowel/bladder dysfunction, cough, dyspnea, cough, bleeding.    In the ED he persented with tachycardia  and hypertension 144/84. CBC notable Hgb 13.1. CMP notable Na 133, , Cr 1.3. CRP 19.7. ESR 31. BHB normal. Lactate normal. Left foot XR demonstrates soft tissue gas in 1st digit. CXR no acute process. He received 1 l NS and 5 units lispro. Orthopedist Dr. Small was contacted.by ED provider.    POC discussed with orthopedist Dr. Small. He advised pre-operative MRI to determine extent of infection.    POC discussed with ID pharmacist Robert. Agreed with holding off antibiotics due to not sepsis to improve operative culture yield. Recommends unasyn. MRSA coverage TBD.    I discussed the plan of care with patient, bedside RN, pharmacy, and orthopedics and ED provider .    Review of Systems  Review of Systems   Constitutional:  Positive for fever.  Negative for chills, diaphoresis and malaise/fatigue.   HENT:  Positive for congestion. Negative for ear pain, nosebleeds, sinus pain and sore throat.    Eyes:  Negative for pain.   Respiratory:  Negative for cough and shortness of breath.    Cardiovascular:  Positive for leg swelling. Negative for chest pain.   Gastrointestinal:  Negative for abdominal pain, blood in stool, constipation, diarrhea, melena, nausea and vomiting.   Genitourinary:  Negative for dysuria, frequency and urgency.   Musculoskeletal:  Positive for myalgias. Negative for back pain, falls, joint pain and neck pain.   Skin:  Negative for rash.   Neurological:  Positive for weakness. Negative for dizziness, loss of consciousness and headaches.   Psychiatric/Behavioral:  Negative for depression. The patient is not nervous/anxious.        Past Medical History   has a past medical history of Diabetes (HCC) and Hypertension.    Surgical History   has a past surgical history that includes tonsillectomy.     Family History  family history includes Diabetes in his mother and paternal uncle.   Family history reviewed with patient. There is no family history that is pertinent to the chief complaint.     Social History   reports that he has never smoked. He has never used smokeless tobacco. He reports that he does not currently use alcohol. He reports that he does not use drugs.    Allergies  Allergies   Allergen Reactions    Farxiga [Dapagliflozin] Diarrhea and Nausea          Sitagliptin      Other reaction(s): GI Upset    Empagliflozin Vomiting    Metformin Diarrhea     Other reaction(s): GI Upset  BACK PAIN, PROTEINURIA.       Medications  Prior to Admission Medications   Prescriptions Last Dose Informant Patient Reported? Taking?   acetaminophen (ACETAMINOPHEN 8 HOUR) 650 MG CR tablet 11/17/2024 Bedtime Patient Yes Yes   Sig: Take 1,950 mg by mouth 1 time a day as needed for Mild Pain. 3 tablets= 1950mg   furosemide (LASIX) 20 MG Tab 11/18/2024  Morning Patient[facility] Home Pharmacy, Patient Yes Yes   Sig: Take 20 mg by mouth 2 times a day.   losartan (COZAAR) 25 MG Tab 11/18/2024 Morning Patient's Home Pharmacy, Patient Yes Yes   Sig: Take 25 mg by mouth every day.      Facility-Administered Medications: None       Physical Exam  Temp:  [36.4 °C (97.6 °F)] 36.4 °C (97.6 °F)  Pulse:  [] 100  Resp:  [14-17] 17  BP: (136-163)/(78-95) 142/78  SpO2:  [90 %-95 %] 93 %  Blood Pressure: (!) 144/84   Temperature: 36.4 °C (97.6 °F)   Pulse: 100   Respiration: 15   Pulse Oximetry: 93 %       Physical Exam  Vitals and nursing note reviewed. Exam conducted with a chaperone present (Primary RN at bedside).   Constitutional:       General: He is not in acute distress.     Appearance: He is not ill-appearing, toxic-appearing or diaphoretic.   HENT:      Head: Normocephalic.      Nose: Nose normal.      Mouth/Throat:      Mouth: Mucous membranes are dry.   Eyes:      General: No scleral icterus.     Conjunctiva/sclera: Conjunctivae normal.   Cardiovascular:      Rate and Rhythm: Normal rate and regular rhythm.      Pulses: Normal pulses.           Dorsalis pedis pulses are 2+ on the right side and 2+ on the left side.      Heart sounds: No murmur heard.     No friction rub. No gallop.   Pulmonary:      Effort: Pulmonary effort is normal. No respiratory distress.      Breath sounds: Decreased breath sounds present. No wheezing, rhonchi or rales.   Abdominal:      General: Abdomen is protuberant. Bowel sounds are normal. There is no distension.      Palpations: Abdomen is soft.      Tenderness: There is no abdominal tenderness. There is no guarding or rebound.   Genitourinary:     Comments: No mahmood  Musculoskeletal:      Cervical back: Neck supple.      Right lower leg: Edema present.      Left lower leg: Edema present.      Comments: 3+ pitting to thighs   Skin:     General: Skin is warm and dry.      Findings: Rash (BLE stasis dermatitis) present.      Comments: See wound  "images below. Unable to probe depth of ulcer due to deep infection. Purulent malodorous drainage.   Neurological:      Mental Status: He is alert.      Comments: Appropriately conversant, moves all extremities   Psychiatric:         Mood and Affect: Mood normal.         Behavior: Behavior normal.         Thought Content: Thought content normal.         Judgment: Judgment normal.                   Laboratory:  Recent Labs     11/18/24  0944   WBC 9.5   RBC 4.70   HEMOGLOBIN 13.1*   HEMATOCRIT 40.5*   MCV 86.2   MCH 27.9   MCHC 32.3   RDW 40.0   PLATELETCT 259   MPV 10.4     Recent Labs     11/18/24  0944   SODIUM 133*   POTASSIUM 3.9   CHLORIDE 101   CO2 23   GLUCOSE 454*   BUN 16   CREATININE 1.30   CALCIUM 8.7     Recent Labs     11/18/24  0944   ALTSGPT 16   ASTSGOT 15   ALKPHOSPHAT 128*   TBILIRUBIN 0.4   GLUCOSE 454*         No results for input(s): \"NTPROBNP\" in the last 72 hours.      No results for input(s): \"TROPONINT\" in the last 72 hours.    Imaging:  DX-CHEST-PORTABLE (1 VIEW)   Final Result      No acute cardiopulmonary disease evident.      DX-FOOT-COMPLETE 3+ LEFT   Final Result      1.  Skin defect overlying the dorsum of the first digit is noted with extensive associated soft tissue gas. Underlying radiographic evidence of osteomyelitis is not well visualized but cannot be excluded.   2.  No acute fracture or malalignment.      MR-FOOT-WITH & W/O LEFT    (Results Pending)       X-Ray:  I have personally reviewed the images and compared with prior images. and My impression is: no acute process    Assessment/Plan:  Justification for Admission Status  I anticipate this patient will require at least two midnights for appropriate medical management, necessitating inpatient admission because soft tissue gas and purulent drainage almost assuredly deep into bone. He will likely require surgical intervention for which extent is TBD by MRI. He will require skilled wound care and antibiotics TBD by operative " cultures, which will be 24-72h from surgery. He also has YUNIOR likely from increased metabolic demand and fluid loss from infection which requires IVF intervention, limited PO intake due to surgical planning..    Patient will need a Med/Surg bed on MEDICAL service .  The need is secondary to surgical determination of osteomyelitis, antibiotics TBD, skilled wound care, initiation of insulin.    * Left hallux osteomyelitis (HCC)- (present on admission)  Assessment & Plan  Unstageable clinically but deep purulence with soft tissue gas  Orthopedics consulted, STAT MRI for extent of infection and preoperative planning  Tachycardia without other SIRS and does not meet criteria for sepsis  Antibiotics deferred until surgical cultures, initiate vancomycin & unasyn if febrile or unstable  Intact DP pulses - no arterial evaluation warranted    Encounter for preoperative assessment- (present on admission)  Assessment & Plan  Admit to:    Medicine    Cardiovascular:   Patient does not have history of CHF  Pre-op EKG: No     Pulmonary:  Oxygen per protocol  Incentive Spirometer    GI:   No history of cirrhosis. Standard bowel regimen. Hold for loose stools.    Renal:   IV fluids: No fluids - risk of fluid overload    Labs: Metabolic Panel with AM labs    Musculoskeletal:   Check 25 OH vitamin D level. If 31-40 pg/mL, consider starting vitamin D3 1000 IU PO daily. If 20-30 pg/mL, consider vitamin D3 2000 IU PO daily. If <20 pg/mL, vitamin D2 50,000 IU weekly x 8 weeks then 2000 IU PO daily.    Neurologic:   Non-opoid pain control preoperative, opiate pain medication postoperative    Hematologic:  Plan on pharmacologic DVT prophylaxis post operative day #1. Hold for decreasing hemoglobin. Notify provider for hemoglobin less than 8.  Order preoperative type and cross.   Hgb every 6 hours if high bleeding risk.   If patient was on anticoagulation prior to arrival risks and benefits will be weighed by teams including surgery,  hospitalist, geriatrics, and anesthesia for delaying surgery more than 24 hours.   On anticoagulation prior to arrival: No    Medical Assessment Risk:  Intermediate    Surgical Risk:   Low      RAFAELA (obstructive sleep apnea)- (present on admission)  Assessment & Plan  Not on CPAP due to transition of insurance and providers after sleep study  Referral to pulmonary sleep medicine if needed at discharge    Severe obesity (BMI >= 40) ([facility])- (present on admission)  Assessment & Plan  Outpatient weight loss  Refer to bariatric surgery at discharge if amenable    YUNIOR (acute kidney injury) ([facility])- (present on admission)  Assessment & Plan  Likely due to excess fluid loss from wounds  1 L NS, encourage PO when not NPO for surgery  Repeat AM CMP       Type 2 diabetes mellitus with foot ulcer, without long-term current use of insulin ([facility])- (present on admission)  Assessment & Plan  Poorly controlled in past due to financial and psychosocial factors  Repeat A1c ordered  Initiating basal / bolus insulin  RD consult for education  Diabetes Educator consult for new insulin administration        VTE prophylaxis: SCDs/TEDs and pharmacologic prophylaxis contraindicated due to surgical planning

## 2024-11-18 NOTE — ASSESSMENT & PLAN NOTE
Unstageable clinically but deep purulence with soft tissue gas  Orthopedics consulted, STAT MRI for extent of infection and preoperative planning  Tachycardia without other SIRS and does not meet criteria for sepsis  Antibiotics deferred until surgical cultures, initiate vancomycin & unasyn if febrile or unstable  Intact DP pulses - no arterial evaluation warranted

## 2024-11-18 NOTE — ED PROVIDER NOTES
ED Provider Note    CHIEF COMPLAINT  Chief Complaint   Patient presents with    Wound Check       EXTERNAL RECORDS REVIEWED  Reviewed medication list    HPI/ROS  LIMITATION TO HISTORY   Patient is a poor historian  OUTSIDE HISTORIAN(S):  None    Marek Francis Jr. is a 43 y.o. male who presents for evaluation of increasing pain redness and swelling and drainage from the left foot as well as elevated blood sugar.  The patient has a history of diabetes.  He is currently not on any medication.  He apparently was prescribed some oral antibiotics through a telehealth encounter last week and is on some sort of antibiotic he cannot recall the name.  He had previously been on medication for diabetes but has been out of any of his medications for diabetes for several months.  He denies high fevers or chills.  He has no history of insulin use.  No associated night sweats or weight loss.    PAST MEDICAL HISTORY   has a past medical history of Diabetes (HCC) and Hypertension.Diabetes,    SURGICAL HISTORY   has a past surgical history that includes tonsillectomy.    FAMILY HISTORY  Family History   Problem Relation Age of Onset    Diabetes Mother     Diabetes Paternal Uncle        SOCIAL HISTORY  Social History     Tobacco Use    Smoking status: Never    Smokeless tobacco: Never   Substance and Sexual Activity    Alcohol use: Not Currently    Drug use: Never    Sexual activity: Not on file     No drug or alcohol abuse  CURRENT MEDICATIONS  Home Medications       Reviewed by Sheila Galloway (Pharmacy Tech) on 11/18/24 at 1104  Med List Status: Complete     Medication Last Dose Status   acetaminophen (ACETAMINOPHEN 8 HOUR) 650 MG CR tablet 11/17/2024 Active   cephALEXin (KEFLEX) 500 MG Cap 11/12/2024 Active   doxycycline (VIBRAMYCIN) 100 MG Cap 11/15/2024 Active   furosemide (LASIX) 20 MG Tab 11/18/2024 Active   losartan (COZAAR) 25 MG Tab 11/18/2024 Active                  Audit from Redirected Encounters    **Home  "medications have not yet been reviewed for this encounter**         ALLERGIES  Allergies   Allergen Reactions    Farxiga [Dapagliflozin] Diarrhea and Nausea          Sitagliptin      Other reaction(s): GI Upset    Empagliflozin Vomiting    Metformin Diarrhea     Other reaction(s): GI Upset  BACK PAIN, PROTEINURIA.       PHYSICAL EXAM  VITAL SIGNS: BP (!) 151/85   Pulse (!) 104   Temp 36.4 °C (97.6 °F) (Temporal)   Resp 14   Ht 1.676 m (5' 6\")   Wt (!) 137 kg (302 lb 4 oz)   SpO2 94%   BMI 48.78 kg/m²    Pulse ox interpretation: I interpret this pulse ox as normal.  Constitutional: Alert and oriented x 3, patient appears chronically ill  HEENT: Atraumatic normocephalic, pupils are equal round reactive to light extraocular movements are intact. The nares is clear, external ears are normal, mouth shows moist mucous membranes normal dentition for age  Neck: Supple, no JVD no tracheal deviation  Cardiovascular: Mild tachycardia no murmur rub or gallop 2+ pulses peripherally x4  Thorax & Lungs: No respiratory distress, no wheezes rales or rhonchi, No chest tenderness.   GI: Soft nontender nondistended positive bowel sounds, no peritoneal signs no rebound or guarding  Skin: Warm dry no acute rash or lesion  Musculoskeletal: Moving all extremities with full range and 5 of 5 strength no acute  deformity  Right lower extremity is diffusely erythematous with purulent drainage at the base of the great toe as imaged below.  No necrosis    Neurologic: Cranial nerves III through XII are grossly intact no sensory deficit no cerebellar dysfunction   Psychiatric: Appropriate affect for situation at this time          EKG/LABS  Results for orders placed or performed during the hospital encounter of 11/18/24   POCT glucose device results    Collection Time: 11/18/24  8:32 AM   Result Value Ref Range    POC Glucose, Blood 421 (HH) 65 - 99 mg/dL   CBC with Differential    Collection Time: 11/18/24  9:44 AM   Result Value Ref Range "    WBC 9.5 4.8 - 10.8 K/uL    RBC 4.70 4.70 - 6.10 M/uL    Hemoglobin 13.1 (L) 14.0 - 18.0 g/dL    Hematocrit 40.5 (L) 42.0 - 52.0 %    MCV 86.2 81.4 - 97.8 fL    MCH 27.9 27.0 - 33.0 pg    MCHC 32.3 32.3 - 36.5 g/dL    RDW 40.0 35.9 - 50.0 fL    Platelet Count 259 164 - 446 K/uL    MPV 10.4 9.0 - 12.9 fL    Neutrophils-Polys 76.90 (H) 44.00 - 72.00 %    Lymphocytes 11.40 (L) 22.00 - 41.00 %    Monocytes 8.00 0.00 - 13.40 %    Eosinophils 1.40 0.00 - 6.90 %    Basophils 0.30 0.00 - 1.80 %    Immature Granulocytes 2.00 (H) 0.00 - 0.90 %    Nucleated RBC 0.00 0.00 - 0.20 /100 WBC    Neutrophils (Absolute) 7.34 1.82 - 7.42 K/uL    Lymphs (Absolute) 1.09 1.00 - 4.80 K/uL    Monos (Absolute) 0.76 0.00 - 0.85 K/uL    Eos (Absolute) 0.13 0.00 - 0.51 K/uL    Baso (Absolute) 0.03 0.00 - 0.12 K/uL    Immature Granulocytes (abs) 0.19 (H) 0.00 - 0.11 K/uL    NRBC (Absolute) 0.00 K/uL   Comp Metabolic Panel    Collection Time: 11/18/24  9:44 AM   Result Value Ref Range    Sodium 133 (L) 135 - 145 mmol/L    Potassium 3.9 3.6 - 5.5 mmol/L    Chloride 101 96 - 112 mmol/L    Co2 23 20 - 33 mmol/L    Anion Gap 9.0 7.0 - 16.0    Glucose 454 (H) 65 - 99 mg/dL    Bun 16 8 - 22 mg/dL    Creatinine 1.30 0.50 - 1.40 mg/dL    Calcium 8.7 8.5 - 10.5 mg/dL    Correct Calcium 9.7 8.5 - 10.5 mg/dL    AST(SGOT) 15 12 - 45 U/L    ALT(SGPT) 16 2 - 50 U/L    Alkaline Phosphatase 128 (H) 30 - 99 U/L    Total Bilirubin 0.4 0.1 - 1.5 mg/dL    Albumin 2.7 (L) 3.2 - 4.9 g/dL    Total Protein 6.6 6.0 - 8.2 g/dL    Globulin 3.9 (H) 1.9 - 3.5 g/dL    A-G Ratio 0.7 g/dL   CRP QUANTITIVE (NON-CARDIAC)    Collection Time: 11/18/24  9:44 AM   Result Value Ref Range    Stat C-Reactive Protein 19.70 (H) 0.00 - 0.75 mg/dL   BETA-HYDROXYBUTYRIC ACID    Collection Time: 11/18/24  9:44 AM   Result Value Ref Range    beta-Hydroxybutyric Acid 0.17 0.02 - 0.27 mmol/L   LACTIC ACID    Collection Time: 11/18/24  9:44 AM   Result Value Ref Range    Lactic Acid 1.0 0.5 -  2.0 mmol/L   ESTIMATED GFR    Collection Time: 11/18/24  9:44 AM   Result Value Ref Range    GFR (CKD-EPI) 70 >60 mL/min/1.73 m 2     I have independently interpreted this EKG    RADIOLOGY/PROCEDURES   I have independently interpreted the diagnostic imaging associated with this visit and am waiting the final reading from the radiologist.   My preliminary interpretation is as follows: X-ray is negative, diffuse gas noted on the dorsal aspect of the great toe    Radiologist interpretation:  DX-CHEST-PORTABLE (1 VIEW)   Final Result      No acute cardiopulmonary disease evident.      DX-FOOT-COMPLETE 3+ LEFT   Final Result      1.  Skin defect overlying the dorsum of the first digit is noted with extensive associated soft tissue gas. Underlying radiographic evidence of osteomyelitis is not well visualized but cannot be excluded.   2.  No acute fracture or malalignment.      MR-FOOT-WITH & W/O LEFT    (Results Pending)       COURSE & MEDICAL DECISION MAKING    ASSESSMENT, COURSE AND PLAN  Care Narrative:     This is an ill-appearing 43-year-old poorly compliant diabetic who presents here with significant inflammation and erythema in the left foot.  Septic protocols initiated on arrival.  Here the patient has hyperglycemia without metabolic acidosis.  His sepsis markers including lactic acid are reassuring and normal.  Chest x-ray is negative but radiograph of left foot demonstrates diffuse soft tissue swelling and gas concerning for osteomyelitis.  Emergent consultation with orthopedics was obtained with  regarding possible need for emergent surgical debridement.  I have ordered IV Zosyn and vancomycin after consulting with ER pharmacist.  He was given IV fluids due to hyperglycemia.  He was also given subcutaneous insulin.  He is quite ill and has a limb threatening infection but does not appear to be fulminant aseptic.  Orthopedics would like a stat MRI of the foot with and without contrast to help manage  operative intervention.  Patient will be admitted to the hospitalist service for further treatment and evaluation.  Full sepsis bolus was not clinically indicated as there is no hypotension or lactic acidemia.    Hydration: Based on the patient's presentation of Hyperglycemia the patient was given IV fluids. IV Hydration was used because oral hydration was not adequate alone. Upon recheck following hydration, the patient was improving.          ADDITIONAL PROBLEMS MANAGED      DISPOSITION AND DISCUSSIONS  I have discussed management of the patient with the following physicians and CLAUDIO's: Discussed plan of care with orthopedics as well as hospitalist    Discussion of management with other QHP or appropriate source(s): Discussed plan of care with ER pharmacist    Escalation of care considered, and ultimately not performed: None    Barriers to care at this time, including but not limited to: None.     Decision tools and prescription drugs considered including, but not limited to: None.    FINAL DIAGNOSIS  1. Diabetic ulcer of other part of foot associated with type 1 diabetes mellitus, limited to breakdown of skin, unspecified laterality (HCC)    2. Cellulitis of foot, left    3. Hyperglycemia         Electronically signed by: Hitesh Michael M.D., 11/18/2024 9:24 AM

## 2024-11-18 NOTE — TREATMENT PLAN
Discussed with ED.  Patient has left foot infection in the setting of uncontrolled diabetes.  Patient is currently hemodynamically stable and not septic.  Recommend MRI to evaluate for extent of abscess and/or bony involvement.  Please contact orthopedics once the MRI is completed.

## 2024-11-18 NOTE — ED TRIAGE NOTES
.  Chief Complaint   Patient presents with    Wound Check     Pt BIBA to triage from home. Pt has referral for wound care but unknown when will be able to get apt. Bleeding controlled in triage. Bilateral lower leg wounds. L>R. Oozing discharge.   FSBG in triage 421.   Currently on antibiotics for wound. Pt states compliant. Denies fevers today.  Sepsis score 2     Hx lymphedema, type 2 DM     Equal chest rise and fall bilaterally. Patient in no acute distress. Patient educated on triage process and to alert staff if anything changes. Pt placed in triage area.  Apologized for wait times.

## 2024-11-18 NOTE — PROGRESS NOTES
Patient assessed at 1330. Patient Aox4 and reports no pain. Patient sitting up in hospital bed. Skin check completed. Patient has diabetic foot wound on left foot, it is weeping brown discharge. Patient sacrum is red. Patient has bilateral leg swelling, 3+ pitting edema.Patient bed is in low, locked position with bed alarm on. Standard fall precautions are in place. Personal belonging and call light are within reach. Patient reinforced to use call light when needed.

## 2024-11-19 NOTE — CARE PLAN
The patient is Stable - Low risk of patient condition declining or worsening    Shift Goals  Clinical Goals: Patient will amublate to the bathroom 3 times during course of shift  Patient Goals: rest, wound care  Family Goals: DENNIS    Progress made toward(s) clinical / shift goals:    Problem: Knowledge Deficit - Diabetes  Goal: Patient will demonstrate knowledge of insulin injection, symptoms, and treatment of hypoglycemia and diet prior to discharge  Outcome: Progressing     Patient educated on diabetic management. Diabetic consult ordered. Patient educated on plan of care, medications, and procedures. Patient is Aox4. Patient verbalizes understanding of care. Will continue to update patient on Plan of care during shift.    Problem: Safety  Goal: Will remain free from injury  Outcome: Progressing    Patient remained free from falls during shift. Patient is SBA with cane. Patient bed is in low, locked position with bed alarm on. Standard fall precautions are in place. Personal belonging and call light are within reach. Patient reinforced to use call light when needed.      Patient is not progressing towards the following goals:    Problem: Wound/ / Incision Healing  Goal: Patient's wound/surgical incision will decrease in size and heals properly  Outcome: Not Progressing    Patient has diabetic food ulcer on left foot. Wound consult placed. Rn wound protocol started. Dressing applied to wound. Will continue to monitor and change dressing as needed per order.

## 2024-11-19 NOTE — DISCHARGE INSTRUCTIONS
Discharge Instructions per Matt Chase M.D.    You were hospitalized at [facility] for a diabetic ulcer which likely tracks to the bone (osteomyelitis). An orthopedic surgeon was consulted and recommended an MRI for surgical planning. No antibiotics were started to optimize surgical cultures and post-operative antibiotic plan. You were not septic and did not warrant emergent antibiotics nor surgery.    Your bloodwork indicate acute kidney dysfunction (YUNIOR) likely due to excess fluid loss from the legs. You were treated with IV fluids. Please stay hydrated as able until further medical care.    DIET: Regular    ACTIVITY: Regular    DIAGNOSIS: Left Hallux Osteomyelitis, Acute Kidney Injury    Return to ER if you develop a fever (>100.4 F or >38 C), develop severe pain, become unable to urinate, or faint for any reason.

## 2024-11-19 NOTE — DISCHARGE SUMMARY
Discharge Summary    CHIEF COMPLAINT ON ADMISSION  Chief Complaint   Patient presents with    Wound Check       Reason for Admission  Wound Check     Admission Date  11/18/2024    CODE STATUS  Full Code    HPI & HOSPITAL COURSE  This is a 43 y.o. male with uncontrolled T2DM, HTN, RAFAELA here with Left foot ulcer.    He presented to [facility] with drainage, intermittent pain, and prior fever due to a Left hallux ulcer. Please see H&P today for complete HPI.    In the ED he did not meet sepsis criteria. Soft tissue gas on the XR and examination of the wound were concerning for osteomyelitis. Orthopedics was consulted and recommended MRI to determine extent of infection for surgery. Antibiotics were deferred until surgical cultures available. A1c was 11.8 for which he was initiated on insulin and intended for diabetes education. While NPO and waiting for pre-operative MRI he opted to leave and seek care at another hospital. He declined prescription of medications for diabetes. Antibiotics were again deferred due to clinical stability to optimize surgical cultures for postoperative antibiotic plan.    Therefore, he is discharged in guarded and stable condition against medcial advice.    The patient met 2-midnight criteria for an inpatient stay at the time of discharge.    Discharge Date  11/18/2024    FOLLOW UP ITEMS POST DISCHARGE    Osteomyelitis - follow up with orthopedics or return to ED if symptoms worsen    DISCHARGE DIAGNOSES  Principal Problem:    Left hallux osteomyelitis (HCC) (POA: Yes)  Active Problems:    Type 2 diabetes mellitus with foot ulcer, without long-term current use of insulin (HCC) (POA: Yes)    YUNIOR (acute kidney injury) (HCC) (POA: Yes)    Severe obesity (BMI >= 40) (HCC) (POA: Yes)    RAFAELA (obstructive sleep apnea) (POA: Yes)  Resolved Problems:    Encounter for preoperative assessment (POA: Yes)      FOLLOW UP  No future appointments.  [facility]  [address]  [id number]  [phone]  Go to  If symptoms worsen      MEDICATIONS ON DISCHARGE     Medication List        ASK your doctor about these medications        Instructions   Acetaminophen 8 Hour 650 MG CR tablet  Generic drug: acetaminophen   Take 1,950 mg by mouth 1 time a day as needed for Mild Pain. 3 tablets= 1950mg  Dose: 1,950 mg     furosemide 20 MG Tabs  Commonly known as: Lasix   Take 20 mg by mouth 2 times a day.  Dose: 20 mg     losartan 25 MG Tabs  Commonly known as: Cozaar   Take 25 mg by mouth every day.  Dose: 25 mg              Allergies  Allergies   Allergen Reactions    Farxiga [Dapagliflozin] Diarrhea and Nausea          Sitagliptin      Other reaction(s): GI Upset    Empagliflozin Vomiting    Metformin Diarrhea     Other reaction(s): GI Upset  BACK PAIN, PROTEINURIA.       DIET  Orders Placed This Encounter   Procedures    Diet Order Diet: Consistent CHO (Diabetic)     Standing Status:   Standing     Number of Occurrences:   1     Order Specific Question:   Diet:     Answer:   Consistent CHO (Diabetic) [4]       ACTIVITY  As tolerated.  Weight bearing as tolerated    CONSULTATIONS  Orthopedics    PROCEDURES  None    LABORATORY  Lab Results   Component Value Date    SODIUM 133 (L) 11/18/2024    POTASSIUM 3.9 11/18/2024    CHLORIDE 101 11/18/2024    CO2 23 11/18/2024    GLUCOSE 454 (H) 11/18/2024    BUN 16 11/18/2024    CREATININE 1.30 11/18/2024    GLOMRATE 74 01/17/2023        Lab Results   Component Value Date    WBC 9.5 11/18/2024    HEMOGLOBIN 13.1 (L) 11/18/2024    HEMATOCRIT 40.5 (L) 11/18/2024    PLATELETCT 259 11/18/2024        Total time of the admission and discharge process exceeds 78 minutes.

## 2024-11-19 NOTE — PROGRESS NOTES
Marek Spence Tito Madden has chosen to leave the hospital against medical advice. The attending physician has not discharged the patient. The provider is aware that the patient is leaving against medical advice. Patient expresses understanding of the risks of leaving the hospital and benefits of admission including but not limited to, the availability and proximity of nurses, physicians, monitoring, diagnostic testing, treatment, and a safe discharge plan. The patient had the opportunity to ask questions about their medical condition and recommended treatment.  Patient is aware that they may return for care at any time.

## 2024-11-23 LAB
BACTERIA BLD CULT: NORMAL
BACTERIA BLD CULT: NORMAL
SIGNIFICANT IND 70042: NORMAL
SIGNIFICANT IND 70042: NORMAL
SITE SITE: NORMAL
SITE SITE: NORMAL
SOURCE SOURCE: NORMAL
SOURCE SOURCE: NORMAL

## 2024-12-02 ENCOUNTER — TELEPHONE (OUTPATIENT)
Dept: HEALTH INFORMATION MANAGEMENT | Facility: OTHER | Age: 44
End: 2024-12-02
Payer: COMMERCIAL

## 2024-12-02 ENCOUNTER — HOME HEALTH ADMISSION (OUTPATIENT)
Dept: HOME HEALTH SERVICES | Facility: HOME HEALTHCARE | Age: 44
End: 2024-12-02
Payer: COMMERCIAL

## 2025-02-18 ENCOUNTER — TELEPHONE (OUTPATIENT)
Dept: ENDOCRINOLOGY | Facility: MEDICAL CENTER | Age: 45
End: 2025-02-18

## 2025-02-18 NOTE — TELEPHONE ENCOUNTER
Pt called needing to schedule appt. Informed pt we are only accepting internal [facility] referrals at the moment and we would need one to get him scheduled.